# Patient Record
Sex: MALE | Race: WHITE | NOT HISPANIC OR LATINO | Employment: FULL TIME | URBAN - METROPOLITAN AREA
[De-identification: names, ages, dates, MRNs, and addresses within clinical notes are randomized per-mention and may not be internally consistent; named-entity substitution may affect disease eponyms.]

---

## 2021-03-15 PROBLEM — S43.003A SUBLUXATION OF TENDON OF LONG HEAD OF BICEPS: Status: ACTIVE | Noted: 2021-03-15

## 2021-03-15 PROBLEM — S46.812A FULL THICKNESS TEAR OF LEFT SUBSCAPULARIS TENDON: Status: ACTIVE | Noted: 2021-03-15

## 2021-04-08 PROBLEM — S46.812A PARTIAL TEAR OF LEFT SUBSCAPULARIS TENDON: Status: ACTIVE | Noted: 2021-04-08

## 2021-05-21 PROBLEM — Z98.890 S/P ROTATOR CUFF SURGERY: Status: ACTIVE | Noted: 2021-05-21

## 2021-11-14 ENCOUNTER — CLINICAL SUPPORT (OUTPATIENT)
Dept: URGENT CARE | Facility: CLINIC | Age: 51
End: 2021-11-14
Payer: OTHER GOVERNMENT

## 2021-11-14 DIAGNOSIS — Z20.822 ENCOUNTER FOR LABORATORY TESTING FOR COVID-19 VIRUS: Primary | ICD-10-CM

## 2021-11-14 LAB
CTP QC/QA: YES
SARS-COV-2 RDRP RESP QL NAA+PROBE: NEGATIVE

## 2021-11-14 PROCEDURE — 99211 OFF/OP EST MAY X REQ PHY/QHP: CPT | Mod: S$GLB,CS,, | Performed by: EMERGENCY MEDICINE

## 2021-11-14 PROCEDURE — 99211 PR OFFICE/OUTPT VISIT, EST, LEVL I: ICD-10-PCS | Mod: S$GLB,CS,, | Performed by: EMERGENCY MEDICINE

## 2021-11-14 PROCEDURE — U0002 COVID-19 LAB TEST NON-CDC: HCPCS | Mod: QW,S$GLB,, | Performed by: EMERGENCY MEDICINE

## 2021-11-14 PROCEDURE — U0002: ICD-10-PCS | Mod: QW,S$GLB,, | Performed by: EMERGENCY MEDICINE

## 2023-10-31 ENCOUNTER — OFFICE VISIT (OUTPATIENT)
Dept: OTOLARYNGOLOGY | Facility: CLINIC | Age: 53
End: 2023-10-31
Payer: OTHER GOVERNMENT

## 2023-10-31 ENCOUNTER — CLINICAL SUPPORT (OUTPATIENT)
Dept: AUDIOLOGY | Facility: CLINIC | Age: 53
End: 2023-10-31
Payer: OTHER GOVERNMENT

## 2023-10-31 VITALS — WEIGHT: 165.13 LBS | BODY MASS INDEX: 27.51 KG/M2 | HEIGHT: 65 IN

## 2023-10-31 DIAGNOSIS — H91.90 HEARING DIFFICULTY, UNSPECIFIED LATERALITY: ICD-10-CM

## 2023-10-31 DIAGNOSIS — H90.3 SENSORINEURAL HEARING LOSS (SNHL) OF BOTH EARS: Primary | ICD-10-CM

## 2023-10-31 DIAGNOSIS — H91.8X3 ASYMMETRICAL HEARING LOSS: ICD-10-CM

## 2023-10-31 DIAGNOSIS — H93.13 TINNITUS, BILATERAL: Primary | ICD-10-CM

## 2023-10-31 DIAGNOSIS — H93.13 TINNITUS OF BOTH EARS: ICD-10-CM

## 2023-10-31 DIAGNOSIS — H91.90 HEARING DIFFICULTY, UNSPECIFIED LATERALITY: Primary | ICD-10-CM

## 2023-10-31 PROCEDURE — 99999 PR PBB SHADOW E&M-EST. PATIENT-LVL II: CPT | Mod: PBBFAC,,, | Performed by: AUDIOLOGIST-HEARING AID FITTER

## 2023-10-31 PROCEDURE — 99999 PR PBB SHADOW E&M-EST. PATIENT-LVL III: ICD-10-PCS | Mod: PBBFAC,,, | Performed by: STUDENT IN AN ORGANIZED HEALTH CARE EDUCATION/TRAINING PROGRAM

## 2023-10-31 PROCEDURE — 99203 OFFICE O/P NEW LOW 30 MIN: CPT | Mod: S$PBB,,, | Performed by: STUDENT IN AN ORGANIZED HEALTH CARE EDUCATION/TRAINING PROGRAM

## 2023-10-31 PROCEDURE — 99999 PR PBB SHADOW E&M-EST. PATIENT-LVL II: ICD-10-PCS | Mod: PBBFAC,,, | Performed by: AUDIOLOGIST-HEARING AID FITTER

## 2023-10-31 PROCEDURE — 99999 PR PBB SHADOW E&M-EST. PATIENT-LVL III: CPT | Mod: PBBFAC,,, | Performed by: STUDENT IN AN ORGANIZED HEALTH CARE EDUCATION/TRAINING PROGRAM

## 2023-10-31 PROCEDURE — 99212 OFFICE O/P EST SF 10 MIN: CPT | Mod: PBBFAC,PO | Performed by: AUDIOLOGIST-HEARING AID FITTER

## 2023-10-31 PROCEDURE — 99213 OFFICE O/P EST LOW 20 MIN: CPT | Mod: PBBFAC,27,PO | Performed by: STUDENT IN AN ORGANIZED HEALTH CARE EDUCATION/TRAINING PROGRAM

## 2023-10-31 PROCEDURE — 92567 TYMPANOMETRY: CPT | Mod: PBBFAC,PO | Performed by: AUDIOLOGIST-HEARING AID FITTER

## 2023-10-31 PROCEDURE — 99203 PR OFFICE/OUTPT VISIT, NEW, LEVL III, 30-44 MIN: ICD-10-PCS | Mod: S$PBB,,, | Performed by: STUDENT IN AN ORGANIZED HEALTH CARE EDUCATION/TRAINING PROGRAM

## 2023-10-31 PROCEDURE — 92557 COMPREHENSIVE HEARING TEST: CPT | Mod: PBBFAC,PO | Performed by: AUDIOLOGIST-HEARING AID FITTER

## 2023-10-31 NOTE — PROGRESS NOTES
Jimmy Francis was seen 10/31/2023 for an audiological evaluation. Pt was alone during today's visit. Pertinent complaints today include hearing loss and tinnitus AS>AD. Pt confirms history of loud noise exposure as a  in the [employer] and denies early onset of genetic family history of hearing loss. Otoscopy revealed no cerumen in both ears. The tympanic membrane was visualized AU prior to proceeding with the hearing testing.     Results reveal a bilateral normal through 2K Hz sloping to moderately severe HF sensorineural hearing loss with asymmetry at 3&4K Hz, AS>AD.    Speech Reception Thresholds were  0 dBHL for the right ear and 0 dBHL for the left ear.    Word recognition scores were excellent for the right ear and good for the left ear.   Tympanograms were Type A for the right ear and Type A for the left ear.    Audiogram results were reviewed in detail with patient and all questions were answered. Results will be reviewed by the referring provider at the completion of this note. All complaints were addressed during this visit to the patient's satisfaction. Recommend further medical evaluation with an ENT provider for the asymmetry, AS>AD, binaural amplification pending medical clearance, repeat hearing testing in one year due to asymmetry, tinnitus and noise exposure and bilateral hearing protection with either muffs or in-ear protection in loud noises. Plan of care was discussed in detail with the patient, who agreed with the plan as above. [facility].

## 2023-10-31 NOTE — PROGRESS NOTES
"Otolaryngology Clinic Note    Subjective:       Patient ID: Jimmy Francis is a 53 y.o. male.    Chief Complaint: Hearing Loss      History of Present Illness: Jimmy Francis is a 53 y.o. male presenting with hearing loss, tinnitus. Has noise exposure in [employer]. No FH early hearing loss, no concerning meds. Has ringing worse on left than right. Bothers him all the time, worse in quiet. Has not tried anything for this. Hearing bothers his wife, and in restaurant. 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       Past Surgical History:   Procedure Laterality Date    COLONOSCOPY      FIXATION OF TENDON Left 04/08/2021    Procedure: FIXATION, TENDON, OPEN BICEPS TENODESIS;  Surgeon: Chandu Strong II, MD;  Location: [facility] OR;  Service: Orthopedics;  Laterality: Left;    LEFT HEART CATHETERIZATION  10/30/2023    Procedure: Left heart cath;  Surgeon: Fidel Casanova MD;  Location: [facility] CATH;  Service: Cardiology;;    prk  2000    lase eye surgery    ROTATOR CUFF REPAIR Left 04/08/2021    Procedure: REPAIR, ROTATOR CUFF, WITH ALLOGRAFT AUGMENTATION;  Surgeon: Chandu Strong II, MD;  Location: [facility] OR;  Service: Orthopedics;  Laterality: Left;    SHOULDER ARTHROSCOPY Left 04/08/2021    Procedure: ARTHROSCOPY, SHOULDER, WITH ACROMIOPLASTY AND SUBACROMIAL DECOMPRESSION;  Surgeon: Chandu Strong II, MD;  Location: [facility] OR;  Service: Orthopedics;  Laterality: Left;    VASECTOMY       Past Medical History:   Diagnosis Date    Hypertension     borderline="white coat syndrome"    Partial tear of left rotator cuff     Tinnitus      Social Determinants of Health     Tobacco Use: Medium Risk (10/31/2023)    Patient History     Smoking Tobacco Use: Former     Smokeless Tobacco Use: Former     Passive Exposure: Not on file   Alcohol Use: Not on file   Financial Resource Strain: Not on file   Food Insecurity: Not on file   Transportation Needs: Not on file   Physical Activity: Not on file   Stress: Not on file   Social " Connections: Not on file   Housing Stability: Not on file   Depression: Not on file     Review of patient's allergies indicates:  No Known Allergies  Current Outpatient Medications   Medication Instructions    aspirin 81 MG Chew 1 tablet, Oral, Every morning    bempedoic acid-ezetimibe (NEXLIZET) 180-10 mg Tab 180 mg, Oral, Daily    ibuprofen (ADVIL,MOTRIN) 400 mg, Oral, Every 6 hours PRN    multivitamin (THERAGRAN) per tablet 1 tablet, Oral    omega 3-dha-epa-fish oil (FISH OIL) 100-160-1,000 mg Cap Oral    rosuvastatin (CRESTOR) 20 mg, Oral, Nightly    valacyclovir (VALTREX) 1000 MG tablet TAKE 2 TABLETS BY MOUTH EVERY 12 HOURS FOR TOTAL OF 4 GRAMS PER TREATMENT         ENT ROS negative except as stated above.     Patient answers are not available for this visit.            Objective:      There were no vitals filed for this visit.    General: NAD, well appearing  Eyes: Normal conjunctiva and lids  Face: symmetric, nerve intact  Nose: The nose is without any evidence of any deformity. The nasal mucosa is moist. The septum is midline. There is no evidence of septal hematoma. The turbinates are without abnormality.   Ears: The ears are with normal-appearing pinna. Examination of the canals is normal appearing bilaterally. There is no drainage or erythema noted. The tympanic membranes are normal appearing with pearly color, normal-appearing landmarks and normal light reflex. Hearing is grossly intact.  Mouth: No obvious abnormalities to the lips. The teeth are unremarkable. The gingivae are without any obvious evidence of infection or lesion. The oral mucosa is moist and pink. There are no obvious masses to the hard or soft palate.   Oropharynx: The uvula is midline.  The tongue is midline. The posterior pharynx is without erythema or exudate. The tonsils are normal appearing.  Salivary glands: The salivary glands are symmetric and not enlarged, no masses  Neck: No lymphadenopathy, trachea midline, thryoid not  enlarged.  Psych: Normal mood and affect.   Neuro: Grossly intact  Speech: fluent    Test Review: I personally reviewed audio         Assessment and Plan:       1. Sensorineural hearing loss (SNHL) of both ears    2. Tinnitus of both ears          He is medically cleared for hearing aids. [facility]. Not a significant asymmetry, would not scan.     Reviewed tinnitus measures.     RTC: PRN with me    Plan of care was discussed in detail with the patient, who agreed with the plan as above. All questions were answered in detail.     Daiana Gerard MD  Otolaryngology

## 2023-10-31 NOTE — PATIENT INSTRUCTIONS
Tinnitus measures:  1.  Avoid exposure to loud sounds and noises.  Wear ear protection around loud noises.  2.  Get your blood pressure check regularly.  If it is high, see your doctor.  3.  Decrease salt intake.  4.  Avoid stimulants such as caffeine and tobacco.  5.  Exercise daily to improve circulation.  6.  Get adequate rest.  Avoid fatigue.  7.  Stop worrying about the noise.  Recognize the noise as an annoyance and learned to ignore it as much as possible.  8.  Consider a trial of lipoflavanoids, slow-release niacin, or Arches' Tinnitus Formula (over-the-counter).  9.  Purchase a white noise machine to mask tinnitus.  10. Hearing aids can help